# Patient Record
Sex: MALE | Race: WHITE | URBAN - METROPOLITAN AREA
[De-identification: names, ages, dates, MRNs, and addresses within clinical notes are randomized per-mention and may not be internally consistent; named-entity substitution may affect disease eponyms.]

---

## 2019-05-16 ENCOUNTER — Encounter (OUTPATIENT)
Dept: URBAN - METROPOLITAN AREA CLINIC 71 | Facility: CLINIC | Age: 74
End: 2019-05-16
Payer: MEDICARE

## 2019-05-16 PROCEDURE — 92014 COMPRE OPH EXAM EST PT 1/>: CPT | Performed by: OPHTHALMOLOGY

## 2019-06-04 ENCOUNTER — Encounter (OUTPATIENT)
Dept: URBAN - METROPOLITAN AREA CLINIC 71 | Facility: CLINIC | Age: 74
End: 2019-06-04

## 2020-01-01 ENCOUNTER — POST-OPERATIVE VISIT (OUTPATIENT)
Dept: URBAN - METROPOLITAN AREA CLINIC 71 | Facility: CLINIC | Age: 75
End: 2020-01-01
Payer: MEDICARE

## 2020-01-01 ENCOUNTER — SURGERY (OUTPATIENT)
Dept: URBAN - METROPOLITAN AREA SURGERY 44 | Facility: SURGERY | Age: 75
End: 2020-01-01
Payer: MEDICARE

## 2020-01-01 ENCOUNTER — SURGERY (OUTPATIENT)
Dept: URBAN - METROPOLITAN AREA SURGERY 45 | Facility: SURGERY | Age: 75
End: 2020-01-01
Payer: MEDICARE

## 2020-01-01 ENCOUNTER — POST-OPERATIVE VISIT (OUTPATIENT)
Dept: URBAN - METROPOLITAN AREA CLINIC 75 | Facility: CLINIC | Age: 75
End: 2020-01-01
Payer: MEDICARE

## 2020-01-01 DIAGNOSIS — H25.812 COMBINED FORMS OF AGE-RELATED CATARACT, LEFT EYE: ICD-10-CM

## 2020-01-01 DIAGNOSIS — H52.4 PRESBYOPIA: Primary | ICD-10-CM

## 2020-01-01 DIAGNOSIS — Z48.810 ENCOUNTER FOR SURGICAL AFTERCARE FOLLOWING SURGERY ON A SENSE ORGAN: Primary | ICD-10-CM

## 2020-01-01 DIAGNOSIS — H43.813 VITREOUS DEGENERATION, BILATERAL: ICD-10-CM

## 2020-01-01 DIAGNOSIS — H25.813 COMBINED FORMS OF AGE-RELATED CATARACT, BILATERAL: Primary | ICD-10-CM

## 2020-01-01 DIAGNOSIS — Z96.1 PRESENCE OF INTRAOCULAR LENS: Primary | ICD-10-CM

## 2020-01-01 PROCEDURE — 66984 XCAPSL CTRC RMVL W/O ECP: CPT | Performed by: OPHTHALMOLOGY

## 2020-01-01 PROCEDURE — G8907 PT DOC NO EVENTS ON DISCHARG: HCPCS | Performed by: CLINIC/CENTER

## 2020-01-01 PROCEDURE — G8918 PT W/O PREOP ORDER IV AB PRO: HCPCS | Performed by: CLINIC/CENTER

## 2020-01-01 PROCEDURE — 99024 POSTOP FOLLOW-UP VISIT: CPT | Performed by: OPHTHALMOLOGY

## 2020-01-01 PROCEDURE — 92134 CPTRZ OPH DX IMG PST SGM RTA: CPT | Performed by: OPTOMETRIST

## 2020-01-01 PROCEDURE — 92136 OPHTHALMIC BIOMETRY: CPT | Performed by: OPHTHALMOLOGY

## 2020-01-01 PROCEDURE — 92014 COMPRE OPH EXAM EST PT 1/>: CPT | Performed by: OPHTHALMOLOGY

## 2020-01-01 PROCEDURE — 99213 OFFICE O/P EST LOW 20 MIN: CPT | Performed by: OPTOMETRIST

## 2020-01-01 PROCEDURE — 99024 POSTOP FOLLOW-UP VISIT: CPT | Performed by: OPTOMETRIST

## 2020-01-01 ASSESSMENT — INTRAOCULAR PRESSURE
OD: 13
OD: 20
OS: 18
OD: 20
OD: 17
OD: 20
OS: 7
OS: 16
OD: 11
OS: 7
OS: 13
OD: 20
OS: 16
OS: 12

## 2020-01-01 ASSESSMENT — PACHYMETRY
OD: 25.15
OD: 3.77
OS: 3.88
OS: 25.09

## 2020-01-01 ASSESSMENT — KERATOMETRY
OS: 42.50
OD: 42.38

## 2020-01-01 ASSESSMENT — VISUAL ACUITY
OD: 20/30
OD: 20/20
OS: 20/25
OS: 20/25
OS: 20/40
OD: 20/30
OD: 20/30

## 2020-06-11 NOTE — IMPRESSION/PLAN
Impression: Combined forms of age-related cataract, bilateral: H25.813. Plan: Discussed diagnosis in detail with patient.  Recommend cat sx right eye first

## 2020-07-07 NOTE — IMPRESSION/PLAN
Impression: Combined forms of age-related cataract, bilateral: H25.813. Plan: OU: Discussed diagnosis in detail with patient. Discussed treatment options with patient. Discussed risks and benefits and patient understands. Advised patient of condition. Emphasized and explained compliance. Reassured patient of current condition and treatment. Discussed signs and symptoms of retinal detachment. Discussed signs and symptoms of PVD/floaters. Discussed risks of progression. New medication(s) Rx given today. Patient elects to have surgery. Surgical risks and benefits were discussed, explained and understood by patient. Schedule surgery in the OR. Pre op instructions given and understood. Post op instructions given and understood. Lid scrubs and hygiene were explained. Educational materials provided: Cataract and Cataract extraction/lens. Surgical treatment is necessary to improve vision. PATIENT OKAY TO PROCEED CATARACT SURGERY OD FIRST THEN OS. CARE PLUS/ TRIMOXI/ ORA/ TARGET -0.25 SAMPLE OF TOBRADEX GIVEN.

## 2020-07-31 NOTE — IMPRESSION/PLAN
Impression: S/P CE/Standard IOL SA60AT 18.5 D OD - 1 Day. Encounter for surgical aftercare following surgery on a sense organ  Z48.810. Healing well. Looks good. Patient okay to proceed with second eye OS cataract surgery. Informed patient that large floaters are due to trimoxi injection. Vision should improve as cornea heals. Continue PO drops.  Plan: Return as scheduled for 1 week PO with Dr. Reid Apo

## 2020-08-06 NOTE — IMPRESSION/PLAN
Impression: S/P CE/Standard IOL SA60AT 18.5 D OD - 7 Days. Encounter for surgical aftercare following surgery on a sense organ  Z48.810. Plan: looks good- healing well 1 week. ok to proceed with the left eye.

## 2020-08-14 NOTE — IMPRESSION/PLAN
Impression: S/P sa60at 18.0 far. trimoxi. ora OS - 1 Day. Presence of intraocular lens  Z96.1. Excellent post op course   Post operative instructions reviewed. Healing well. Looks good. Patient to call if any pain or tenderness occurs. Plan:  Will have patient return as scheduled for 1 week PO follow up

## 2020-08-20 NOTE — IMPRESSION/PLAN
Impression: S/P sa60at 18.0 far. trimoxi. ora OS - 7 Days. Presence of intraocular lens  Z96.1. Healing well. Looks good. Patient to call if any pain or tenderness occurs.  Plan: Patient to return as scheduled for 4w PO w/ ref

## 2020-09-08 NOTE — IMPRESSION/PLAN
Impression:  Presence of intraocular lens  Z96.1. Plan: Refraction completed today. Discussed in detail the findings at today's visit. Continue using artificial tears 2-3x daily.